# Patient Record
Sex: MALE | Race: WHITE | NOT HISPANIC OR LATINO | Employment: FULL TIME | URBAN - METROPOLITAN AREA
[De-identification: names, ages, dates, MRNs, and addresses within clinical notes are randomized per-mention and may not be internally consistent; named-entity substitution may affect disease eponyms.]

---

## 2019-12-10 ENCOUNTER — OFFICE VISIT (OUTPATIENT)
Dept: FAMILY MEDICINE CLINIC | Facility: CLINIC | Age: 29
End: 2019-12-10
Payer: COMMERCIAL

## 2019-12-10 VITALS
WEIGHT: 147 LBS | OXYGEN SATURATION: 98 % | SYSTOLIC BLOOD PRESSURE: 110 MMHG | DIASTOLIC BLOOD PRESSURE: 62 MMHG | HEIGHT: 67 IN | HEART RATE: 81 BPM | TEMPERATURE: 97.7 F | BODY MASS INDEX: 23.07 KG/M2

## 2019-12-10 DIAGNOSIS — Z23 NEED FOR TDAP VACCINATION: ICD-10-CM

## 2019-12-10 DIAGNOSIS — Z23 NEED FOR INFLUENZA VACCINATION: ICD-10-CM

## 2019-12-10 DIAGNOSIS — Z00.00 HEALTHCARE MAINTENANCE: Primary | ICD-10-CM

## 2019-12-10 PROCEDURE — 90472 IMMUNIZATION ADMIN EACH ADD: CPT | Performed by: FAMILY MEDICINE

## 2019-12-10 PROCEDURE — 90686 IIV4 VACC NO PRSV 0.5 ML IM: CPT | Performed by: FAMILY MEDICINE

## 2019-12-10 PROCEDURE — 90471 IMMUNIZATION ADMIN: CPT | Performed by: FAMILY MEDICINE

## 2019-12-10 PROCEDURE — 99385 PREV VISIT NEW AGE 18-39: CPT | Performed by: FAMILY MEDICINE

## 2019-12-10 PROCEDURE — 90715 TDAP VACCINE 7 YRS/> IM: CPT | Performed by: FAMILY MEDICINE

## 2019-12-10 NOTE — PROGRESS NOTES
Shelley Grady 1990 male MRN: [id number]      ASSESSMENT/PLAN  Problem List Items Addressed This Visit        Other    Healthcare maintenance - Primary      Other Visit Diagnoses     Need for Tdap vaccination        Relevant Orders    TDAP VACCINE GREATER THAN OR EQUAL TO 8YO IM (Completed)    Need for influenza vaccination        Relevant Orders    influenza vaccine, [id number], quadrivalent, 0 5 mL, preservative-free, for adult and pediatric patients 6 mos+ (AFLURIA, FLUARIX, FLULAVAL, FLUZONE) (Completed)        BP WNL  BMI WNL   Defers HIV screening   Flu and TDap given     RTC in 1 year or sooner PRN   No future appointments  SUBJECTIVE  CC: Establish Care (no questions or concerns ) and Immunizations (tdap & flu )      HPI:  Shelley Grady is a 33 y o  male who presents to establish care  History reviewed and updated as below  No acute concerns  His fiance is pregnant with their first child, a girl due to in January, and he would like his flu and Tdap  Review of Systems   Constitutional: Negative for unexpected weight change  HENT: Negative for congestion, ear pain, rhinorrhea and sore throat  Eyes: Negative for visual disturbance  Respiratory: Negative for cough and shortness of breath  Cardiovascular: Negative for chest pain, palpitations and leg swelling  Gastrointestinal: Negative for abdominal pain, constipation and diarrhea  Endocrine: Negative for polydipsia and polyuria  Genitourinary: Negative for difficulty urinating  Neurological: Negative for dizziness and headaches  Psychiatric/Behavioral: Negative for sleep disturbance         Historical Information   The patient history was reviewed and updated as follows:    Past Medical History:   Diagnosis Date    No pertinent past medical history      Past Surgical History:   Procedure Laterality Date    NO PAST SURGERIES       Family History   Problem Relation Age of Onset    No Known Problems Mother Ronal Shell No Known Problems Father     Diabetes Maternal Grandfather       Social History   Social History     Substance and Sexual Activity   Alcohol Use Yes    Frequency: 2-4 times a month    Comment: Social -- sometimes daily sometimes not for a month      Social History     Substance and Sexual Activity   Drug Use Yes    Types: Marijuana    Comment: daily      Social History     Tobacco Use   Smoking Status Never Smoker   Smokeless Tobacco Never Used       Medications:   No current outpatient medications on file  No Known Allergies    OBJECTIVE    Vitals:   Vitals:    12/10/19 1118   BP: 110/62   Pulse: 81   Temp: 97 7 °F (36 5 °C)   SpO2: 98%   Weight: 66 7 kg (147 lb)   Height: 5' 7" (1 702 m)           Physical Exam   Constitutional: He appears well-developed and well-nourished  No distress  HENT:   Head: Normocephalic and atraumatic  Right Ear: External ear normal    Left Ear: External ear normal    Nose: Nose normal    Mouth/Throat: Oropharynx is clear and moist    Eyes: Conjunctivae are normal    Neck: No thyromegaly present  Cardiovascular: Normal rate and regular rhythm  Pulmonary/Chest: Effort normal and breath sounds normal  No respiratory distress  Abdominal: Soft  Bowel sounds are normal  He exhibits no distension  There is no tenderness  Musculoskeletal: He exhibits no edema  Lymphadenopathy:     He has no cervical adenopathy  Neurological: He is alert  Skin: Skin is warm and dry  Psychiatric: He has a normal mood and affect  Vitals reviewed                   DO [facility]   12/10/2019  11:52 AM

## 2024-02-21 PROBLEM — Z00.00 HEALTHCARE MAINTENANCE: Status: RESOLVED | Noted: 2019-12-10 | Resolved: 2024-02-21

## 2025-06-17 ENCOUNTER — OFFICE VISIT (OUTPATIENT)
Age: 35
End: 2025-06-17
Payer: COMMERCIAL

## 2025-06-17 VITALS
BODY MASS INDEX: 21.43 KG/M2 | TEMPERATURE: 97.3 F | RESPIRATION RATE: 18 BRPM | SYSTOLIC BLOOD PRESSURE: 118 MMHG | HEART RATE: 87 BPM | WEIGHT: 136.8 LBS | OXYGEN SATURATION: 99 % | DIASTOLIC BLOOD PRESSURE: 76 MMHG

## 2025-06-17 DIAGNOSIS — Z23 ENCOUNTER FOR IMMUNIZATION: ICD-10-CM

## 2025-06-17 DIAGNOSIS — S91.331A PUNCTURE WOUND OF RIGHT FOOT, INITIAL ENCOUNTER: Primary | ICD-10-CM

## 2025-06-17 PROCEDURE — 90715 TDAP VACCINE 7 YRS/> IM: CPT

## 2025-06-17 PROCEDURE — S9083 URGENT CARE CENTER GLOBAL: HCPCS | Performed by: PHYSICIAN ASSISTANT

## 2025-06-17 PROCEDURE — G0382 LEV 3 HOSP TYPE B ED VISIT: HCPCS | Performed by: PHYSICIAN ASSISTANT

## 2025-06-17 RX ORDER — CEPHALEXIN 500 MG/1
500 CAPSULE ORAL EVERY 6 HOURS SCHEDULED
Qty: 28 CAPSULE | Refills: 0 | Status: SHIPPED | OUTPATIENT
Start: 2025-06-17 | End: 2025-06-24

## 2025-06-17 NOTE — PROGRESS NOTES
[facility]        NAME: Danial Colvin is a 34 y.o. male  : 1990    MRN: [id number]  DATE: 2025  TIME: 1:13 PM    Assessment and Plan   Puncture wound of right foot, initial encounter [S91.331A]  1. Puncture wound of right foot, initial encounter        2. Encounter for immunization  Tdap Vaccine greater than or equal to 6yo    cephalexin (KEFLEX) 500 mg capsule          Go to ER if any worsening, no sign of septic arthritis today    The patient and/or parent/legal guardian verbalized understanding of exam findings and   Treatment plan. We engaged in the shared decision-making process and treatment options were   discussed at length with the patient.  All questions, concerns and complaints were answered and   addressed to the patient's' and/or parent/legal guardians's satisfaction.    Patient Instructions   There are no Patient Instructions on file for this visit.    Follow up with PCP in 3-5 days.  Proceed to  ER if symptoms worsen.    If tests are performed, our office will contact you with results only if   changes need to made to the care plan discussed with you at the visit.   You can review your full results on [facility].     Chief Complaint     Chief Complaint   Patient presents with   • Foot Pain     Small puncture plantar surface adjacent to second toe. Claims stepped on patricio nail yesterday. Now with swelling, redness, pain at site of puncture and corresponding dorsal surface of foot.         History of Present Illness       HPI  Patient comes in complaining of a small puncture wound on the plantar surface of the foot adjacent to the second toe MTP region including she stepped on a nail yesterday denies swelling redness pain at the site of puncture and on the dorsal surface of the foot as well. Happened one day ago. Swelling improved since last night.     Review of Systems   Review of Systems  All other related systems reviewed with patient or accompanying  "historian and are negative except as noted in HPI    Current Medications     Current Medications[1]    Current Allergies     Allergies as of 06/17/2025   • (No Known Allergies)            The following portions of the patient's history were reviewed and updated as appropriate: allergies, current medications, past family history, past medical history, past social history, past surgical history and problem list.     Past Medical History[2]    Past Surgical History[3]    Family History[4]      Medications have been verified.        Objective   /76 (BP Location: Left arm, Patient Position: Sitting, Cuff Size: Adult)   Pulse 87   Temp (!) 97.3 °F (36.3 °C) (Tympanic)   Resp 18   Wt 62.1 kg (136 lb 12.8 oz)   SpO2 99%   BMI 21.43 kg/m²   No LMP for male patient.       Physical Exam     Physical Exam  Constitutional:       General: He is not in acute distress.     Appearance: He is well-developed.   HENT:      Head: Normocephalic and atraumatic.     Eyes:      General: No scleral icterus.     Conjunctiva/sclera: Conjunctivae normal.     Pulmonary:      Effort: Pulmonary effort is normal. No respiratory distress.      Breath sounds: No stridor.     Musculoskeletal:      Cervical back: Normal range of motion and neck supple.      Comments: There is a small close puncture wound plantar aspect of the foot between the 2nd and 3rd metatarsal heads corresponding on the dorsal surface there is some mild tenderness palpation faint erythema there is no micromotion tenderness of the MTP joints      Skin:     General: Skin is warm and dry.      Findings: Erythema present.     Neurological:      Mental Status: He is alert and oriented to person, place, and time.     Psychiatric:         Behavior: Behavior normal.         Ortho Exam        Procedures  No Procedures performed today        Note: Portions of this record may have been created with voice recognition software. Occasional wrong word or \"sound a like\" substitutions " may have occurred due to the inherent limitations of voice recognition software. Please read the chart carefully and recognize, using context, where substitutions have occurred.*             [1]    Current Outpatient Medications:   •  cephalexin (KEFLEX) 500 mg capsule, Take 1 capsule (500 mg total) by mouth every 6 (six) hours for 7 days, Disp: 28 capsule, Rfl: 0[2]  Past Medical History:  Diagnosis Date   • No pertinent past medical history    [3]  Past Surgical History:  Procedure Laterality Date   • NO PAST SURGERIES     [4]  Family History  Problem Relation Name Age of Onset   • No Known Problems Mother     • No Known Problems Father     • Diabetes Maternal Grandfather